# Patient Record
(demographics unavailable — no encounter records)

---

## 2024-11-07 NOTE — ASSESSMENT
[FreeTextEntry1] :  17 year old female patient with scoliosis.   Today's visit included obtaining the history from the child and parent, due to the child's age, the child could not be considered a reliable historian, requiring the parent to act as an independent historian. The condition, natural history, and prognosis were explained to the patient and family. The clinical findings and images were reviewed with the family.  Scoliosis Xrays taken on 9/3/24  were uploaded in this office and were viewed and independently interpreted: RMT measuring 20 degrees and LTL measuring 25.6 degrees; Risser unable to see due to shielding   Scoliosis is defined by measurement of a lateral curve > 10 degrees on a radiograph. However scoliosis is a 3D deformity resulting in lordosis of the scoliotic segment, lateral intervertebral tilting in the coronal plane, and a rotatory component in the axial plane. Idiopathic scoliosis is the most common type of scoliosis and accounts for nearly 80% of patients with structural scoliosis who are otherwise healthy. Adolescent idiopathic scoliosis is used to describe patients who are diagnosed after 10 years but before skeletal maturity. Risks for progression including immaturity defined by risser 0/1, premenarchal status, and curves greater than 20 degrees. 2/3rds of curves > 50 progress, and thoracic curves progress 1 degree / year. Double curves and thoracic curves are also at risk for progressing (lumbar are least likely to worsen).   Clinical signs of scoliosis include shoulder asymmetry, unequal scapular prominence, elevated or prominent hip, waist asymmetry, positive murcia forward bending test.   Curves < 25 may be observed. Bracing may be used with the goal of prevention of curve progression in curves 25-45, and operative intervention is indicated for curves > 45-50. Exceptions may exists for well balanced double curves < 60 where clinical appearance is acceptable.  We discussed that given her age, skeletal maturity, and she is 5 years post-menarchal that we do not expect her curve to progress significantly. We discussed that she has no physical activity restrictions. She will follow-up in 6 months for repeat XRs.   Next Visit: Scoliosis Xrays and bone age  This plan was discussed with the family. Family verbalizes understanding and agreement of plan. All questions and concerns were addressed today.  FÉLIX, ABILIO LAGUNAS, acted as a scribe on behalf of DR. GABRIEL on 11/07/2024.

## 2024-11-07 NOTE — PHYSICAL EXAM
[FreeTextEntry1] :  Gait: Presents ambulating independently without signs of antalgia.  Good coordination and balance noted. Plantigrade foot with heel-to-toe progression. Neutral foot progression angle. GENERAL: Healthy appearing. Alert, cooperative, in NAD SKIN: The skin is intact, warm, pink and dry over the area examined. EYES: Normal conjunctiva, normal eyelids and pupils were equal and round. ENT: normal ears, normal nose and normal lips. CARDIOVASCULAR: brisk capillary refill, but no peripheral edema. RESPIRATORY: The patient is in no apparent respiratory distress. They're taking full deep breaths without use of accessory muscles or evidence of audible wheezes or stridor without the use of a stethoscope. Normal respiratory effort. ABDOMEN: not examined MUSCULOSKELETAL:   Motor: 5/5 BUE: deltoids, biceps, triceps, wrist extension, finger flexion, IO 5/5 BLE: hip flexion, knee extension, knee flexion, ankle dorsiflexion/plantar flexion, EHL Sensory: 2/2 BUE: C5-T1, 2/2 BLE: L2-S1 No clonus Negative hoffmans 2+ patellar reflex 2+ biceps reflex SPINE: skin is intact, right shoulder slightly elevated, pelvis is level, no significant waist crease, left lumbar prominence with murcia forward bend test

## 2024-11-07 NOTE — DATA REVIEWED
[de-identified] : Scoliosis Xrays taken on 9/3/24  were uploaded in this office and were viewed and independently interpreted: RMT measuring 20 degrees and LTL measuring 25.6 degrees; Risser unable to see due to shielding

## 2024-11-07 NOTE — HISTORY OF PRESENT ILLNESS
[FreeTextEntry1] : 17 year old female patient seen for orthopaedic evaluation of scoliosis. Mom states that she has always noticed that her spine was curved and mentioned it to the PCP recently and had an XR done. Mom denies family history of scoliosis. Denies any back pain.   Menstrual History: First menses at 12 years of age

## 2024-11-07 NOTE — END OF VISIT
[FreeTextEntry3] : A physician assistant/resident assisted with documenting the visit and acted as a scribe. I have seen and examined the patient, made my assessment and plan and have made all modifications necessary to the note.  Bonnie Avendaño MD Pediatric Orthopaedics Surgery [facility]

## 2025-07-17 NOTE — DATA REVIEWED
[de-identified] : Scoliosis x-rays taken in office on 7/13/2025 reviewed and independently interpreted.  There is a right main thoracic curve measuring 13.4 degrees, a left thoracolumbar curve measuring 22.6 degrees.  Jeffy 5.  Tam 8.  Scoliosis Xrays taken on 9/3/24  were uploaded in this office and were viewed and independently interpreted: RMT measuring 20 degrees and LTL measuring 25.6 degrees; Risser unable to see due to shielding

## 2025-07-17 NOTE — ASSESSMENT
[FreeTextEntry1] :  17 year old female patient with scoliosis.   Today's visit included obtaining the history from the child and parent, due to the child's age, the child could not be considered a reliable historian, requiring the parent to act as an independent historian. The condition, natural history, and prognosis were explained to the patient and family. The clinical findings and images were reviewed with the family.  Scoliosis x-rays taken in office on 7/13/2025 reviewed and independently interpreted.  There is a right main thoracic curve measuring 13.4 degrees, a left thoracolumbar curve measuring 22.6 degrees.  Risser 5.  Tam 8.  There has been no clear progression as compared to her previous films taken in November 2024.  She is 5 years post menarchal, and skeletally immature.  No need for any additional follow-up or x-rays.  I am happy to see ABE if there are any concerns or anytime a problem arises in the future.   All questions were answered, the family expresses understanding and agrees with the plan of care.   This note was generated using Dragon medical dictation software. A reasonable effort has been made for proofreading its contents, but typos may still remain. If there are any questions or points of clarification needed please do not hesitate to contact my office.

## 2025-07-17 NOTE — HISTORY OF PRESENT ILLNESS
[FreeTextEntry1] : 17 year old female patient seen for orthopaedic evaluation of scoliosis. Mom states that she has always noticed that her spine was curved and mentioned it to the PCP recently and had an XR done. Mom denies family history of scoliosis. Denies any back pain.   7/17/25: She returns for f/u. No issues.  Menstrual History: First menses at 12 years of age